# Patient Record
Sex: FEMALE | Race: BLACK OR AFRICAN AMERICAN | Employment: OTHER | URBAN - METROPOLITAN AREA
[De-identification: names, ages, dates, MRNs, and addresses within clinical notes are randomized per-mention and may not be internally consistent; named-entity substitution may affect disease eponyms.]

---

## 2018-02-07 PROBLEM — R73.9 HYPERGLYCEMIA: Status: ACTIVE | Noted: 2018-02-07

## 2018-02-07 PROBLEM — E78.5 DYSLIPIDEMIA: Status: ACTIVE | Noted: 2018-02-07

## 2018-02-07 PROCEDURE — 87624 HPV HI-RISK TYP POOLED RSLT: CPT | Performed by: FAMILY MEDICINE

## 2018-02-07 PROCEDURE — 88175 CYTOPATH C/V AUTO FLUID REDO: CPT | Performed by: FAMILY MEDICINE

## 2019-02-13 PROBLEM — M65.312 TRIGGER FINGER OF LEFT THUMB: Status: ACTIVE | Noted: 2019-02-13

## 2019-02-13 PROBLEM — N76.0 ACUTE VAGINITIS: Status: ACTIVE | Noted: 2019-02-13

## 2019-02-13 PROBLEM — K59.09 OTHER CONSTIPATION: Status: ACTIVE | Noted: 2019-02-13

## 2019-02-13 PROBLEM — Z12.4 ENCOUNTER FOR SCREENING FOR CERVICAL CANCER: Status: ACTIVE | Noted: 2019-02-13

## 2019-02-13 PROBLEM — Z12.31 VISIT FOR SCREENING MAMMOGRAM: Status: ACTIVE | Noted: 2019-02-13

## 2019-02-13 PROCEDURE — 87070 CULTURE OTHR SPECIMN AEROBIC: CPT | Performed by: FAMILY MEDICINE

## 2019-02-13 PROCEDURE — 88175 CYTOPATH C/V AUTO FLUID REDO: CPT | Performed by: FAMILY MEDICINE

## 2019-02-13 PROCEDURE — 87624 HPV HI-RISK TYP POOLED RSLT: CPT | Performed by: FAMILY MEDICINE

## 2019-02-13 PROCEDURE — 87205 SMEAR GRAM STAIN: CPT | Performed by: FAMILY MEDICINE

## 2019-02-13 PROCEDURE — 87077 CULTURE AEROBIC IDENTIFY: CPT | Performed by: FAMILY MEDICINE

## 2021-05-27 PROBLEM — E66.01 CLASS 2 SEVERE OBESITY DUE TO EXCESS CALORIES WITH SERIOUS COMORBIDITY AND BODY MASS INDEX (BMI) OF 39.0 TO 39.9 IN ADULT (HCC): Status: ACTIVE | Noted: 2021-05-27

## 2021-05-27 PROBLEM — E66.812 CLASS 2 SEVERE OBESITY DUE TO EXCESS CALORIES WITH SERIOUS COMORBIDITY AND BODY MASS INDEX (BMI) OF 39.0 TO 39.9 IN ADULT (HCC): Status: ACTIVE | Noted: 2021-05-27

## 2021-05-27 PROBLEM — Z23 NEED FOR DIPHTHERIA-TETANUS-PERTUSSIS (TDAP) VACCINE: Status: ACTIVE | Noted: 2021-05-27

## 2021-11-19 ENCOUNTER — HOSPITAL ENCOUNTER (INPATIENT)
Facility: HOSPITAL | Age: 66
LOS: 5 days | Discharge: HOME | DRG: 177 | End: 2021-11-24
Attending: EMERGENCY MEDICINE | Admitting: HOSPITALIST
Payer: MEDICARE

## 2021-11-19 ENCOUNTER — APPOINTMENT (OUTPATIENT)
Dept: GENERAL RADIOLOGY | Facility: HOSPITAL | Age: 66
DRG: 177 | End: 2021-11-19
Attending: EMERGENCY MEDICINE
Payer: MEDICARE

## 2021-11-19 DIAGNOSIS — U07.1 COVID-19: Primary | ICD-10-CM

## 2021-11-19 DIAGNOSIS — R09.02 HYPOXIA: ICD-10-CM

## 2021-11-19 PROCEDURE — 96374 THER/PROPH/DIAG INJ IV PUSH: CPT

## 2021-11-19 PROCEDURE — 85025 COMPLETE CBC W/AUTO DIFF WBC: CPT | Performed by: EMERGENCY MEDICINE

## 2021-11-19 PROCEDURE — 82550 ASSAY OF CK (CPK): CPT | Performed by: EMERGENCY MEDICINE

## 2021-11-19 PROCEDURE — 84484 ASSAY OF TROPONIN QUANT: CPT | Performed by: EMERGENCY MEDICINE

## 2021-11-19 PROCEDURE — 93005 ELECTROCARDIOGRAM TRACING: CPT

## 2021-11-19 PROCEDURE — 85379 FIBRIN DEGRADATION QUANT: CPT | Performed by: EMERGENCY MEDICINE

## 2021-11-19 PROCEDURE — 83615 LACTATE (LD) (LDH) ENZYME: CPT | Performed by: EMERGENCY MEDICINE

## 2021-11-19 PROCEDURE — 93010 ELECTROCARDIOGRAM REPORT: CPT

## 2021-11-19 PROCEDURE — 86140 C-REACTIVE PROTEIN: CPT | Performed by: EMERGENCY MEDICINE

## 2021-11-19 PROCEDURE — 96375 TX/PRO/DX INJ NEW DRUG ADDON: CPT

## 2021-11-19 PROCEDURE — 87040 BLOOD CULTURE FOR BACTERIA: CPT | Performed by: EMERGENCY MEDICINE

## 2021-11-19 PROCEDURE — 36415 COLL VENOUS BLD VENIPUNCTURE: CPT

## 2021-11-19 PROCEDURE — 84145 PROCALCITONIN (PCT): CPT | Performed by: EMERGENCY MEDICINE

## 2021-11-19 PROCEDURE — 82728 ASSAY OF FERRITIN: CPT | Performed by: EMERGENCY MEDICINE

## 2021-11-19 PROCEDURE — XW033E5 INTRODUCTION OF REMDESIVIR ANTI-INFECTIVE INTO PERIPHERAL VEIN, PERCUTANEOUS APPROACH, NEW TECHNOLOGY GROUP 5: ICD-10-PCS | Performed by: INTERNAL MEDICINE

## 2021-11-19 PROCEDURE — 80053 COMPREHEN METABOLIC PANEL: CPT | Performed by: EMERGENCY MEDICINE

## 2021-11-19 PROCEDURE — 99285 EMERGENCY DEPT VISIT HI MDM: CPT

## 2021-11-19 PROCEDURE — 71045 X-RAY EXAM CHEST 1 VIEW: CPT | Performed by: EMERGENCY MEDICINE

## 2021-11-19 PROCEDURE — 3E0333Z INTRODUCTION OF ANTI-INFLAMMATORY INTO PERIPHERAL VEIN, PERCUTANEOUS APPROACH: ICD-10-PCS | Performed by: INTERNAL MEDICINE

## 2021-11-19 PROCEDURE — 83880 ASSAY OF NATRIURETIC PEPTIDE: CPT | Performed by: EMERGENCY MEDICINE

## 2021-11-19 RX ORDER — ASPIRIN 81 MG/1
81 TABLET ORAL DAILY
Status: DISCONTINUED | OUTPATIENT
Start: 2021-11-20 | End: 2021-11-24

## 2021-11-19 RX ORDER — LISINOPRIL 20 MG/1
20 TABLET ORAL DAILY
Status: DISCONTINUED | OUTPATIENT
Start: 2021-11-20 | End: 2021-11-24

## 2021-11-19 RX ORDER — ALBUTEROL SULFATE 90 UG/1
2 AEROSOL, METERED RESPIRATORY (INHALATION) EVERY 4 HOURS PRN
Status: DISCONTINUED | OUTPATIENT
Start: 2021-11-19 | End: 2021-11-24

## 2021-11-19 RX ORDER — LISINOPRIL 20 MG/1
20 TABLET ORAL DAILY
COMMUNITY
End: 2022-02-01

## 2021-11-19 RX ORDER — B-COMPLEX WITH VITAMIN C
1 TABLET ORAL DAILY
COMMUNITY
End: 2022-01-05

## 2021-11-19 RX ORDER — ENOXAPARIN SODIUM 100 MG/ML
40 INJECTION SUBCUTANEOUS DAILY
Status: DISCONTINUED | OUTPATIENT
Start: 2021-11-19 | End: 2021-11-24

## 2021-11-19 RX ORDER — ACETAMINOPHEN 325 MG/1
650 TABLET ORAL EVERY 6 HOURS PRN
Status: DISCONTINUED | OUTPATIENT
Start: 2021-11-19 | End: 2021-11-24

## 2021-11-19 RX ORDER — LISINOPRIL 20 MG/1
20 TABLET ORAL DAILY
COMMUNITY
End: 2021-11-19 | Stop reason: CLARIF

## 2021-11-19 RX ORDER — ONDANSETRON 2 MG/ML
4 INJECTION INTRAMUSCULAR; INTRAVENOUS EVERY 6 HOURS PRN
Status: DISCONTINUED | OUTPATIENT
Start: 2021-11-19 | End: 2021-11-24

## 2021-11-19 RX ORDER — MELATONIN
3000 DAILY
Status: DISCONTINUED | OUTPATIENT
Start: 2021-11-20 | End: 2021-11-24

## 2021-11-19 RX ORDER — HYDROCHLOROTHIAZIDE 25 MG/1
25 TABLET ORAL DAILY
COMMUNITY

## 2021-11-19 RX ORDER — ALBUTEROL SULFATE 90 UG/1
2 AEROSOL, METERED RESPIRATORY (INHALATION) EVERY 4 HOURS PRN
COMMUNITY
Start: 2021-11-13 | End: 2022-01-05

## 2021-11-19 RX ORDER — HYDROCHLOROTHIAZIDE 25 MG/1
25 TABLET ORAL DAILY
Status: DISCONTINUED | OUTPATIENT
Start: 2021-11-20 | End: 2021-11-24

## 2021-11-19 RX ORDER — DEXAMETHASONE SODIUM PHOSPHATE 4 MG/ML
6 VIAL (ML) INJECTION ONCE
Status: COMPLETED | OUTPATIENT
Start: 2021-11-19 | End: 2021-11-19

## 2021-11-19 NOTE — ED PROVIDER NOTES
Patient Seen in: [facility] Emergency Department      History   Patient presents with:  Covid  Difficulty Breathing    Stated Complaint: shortness of breath .covid positive saturation 90-92% room air from immediate c*    Subjective:   HPI    Patient 128/56 (BP Location: Right arm)   Pulse 81   Temp 99.4 °F (37.4 °C) (Oral)   Resp 20   Ht 157.5 cm (5' 2\")   Wt 92.1 kg   SpO2 96%   BMI 37.13 kg/m²         Physical Exam    GENERAL: No acute distress, well appearing and non-toxic, Alert and oriented X 3 Resulted by: [id number]: K, LDH, AST,    CK CREATINE KINASE (NOT CREATININE) - Normal   CBC WITH DIFFERENTIAL WITH PLATELET    Narrative: The following orders were created for panel order CBC With Differential With Platelet.   Procedure will be given remdesivir. She is not vaccinated. Did receive monoclonal antibody treatment. Inflammatory markers are elevated. Normal troponin and normal D-dimer. Workup and results were discussed with patient.  Patient has no other questions, complain

## 2021-11-19 NOTE — H&P
[facility] Hospitalist History and Physical      Patient presents with:  Covid  Difficulty Breathing       PCP: Malia Kim MD      History of Present Illness: Patient is a 74year old female with PMH sig for HTN and DONNA who presented to the ED Age of Onset   • Hypertension Father    • Diabetes Maternal Grandmother    • Cancer Maternal Grandfather    • Breast Cancer Maternal Cousin Female 39        42's       Review of Systems  Comprehensive ROS reviewed and negative except for what is stated in vascularity. Patchy alveolar opacities in both mid lower lungs. Mild left basilar atelectasis. CONCLUSION:  Bilateral pneumonia is consistent with diagnosis of COVID-19 pneumonia.     Dictated by (CST): Victorina Umana MD on 11/19/2021 at 4:44 PM

## 2021-11-19 NOTE — ED QUICK NOTES
Orders for admission, patient is aware of plan and ready to go upstairs. Any questions, please call ED RN jonathan  at extension [phone].      Vaccinated? no  Type of COVID test sent: none  COVID Suspicion level: High, tested positive on 11/13      Titratable d

## 2021-11-19 NOTE — ED INITIAL ASSESSMENT (HPI)
Pt sent over from [address] [place] [address] for further eval. Pt covid + on 11/13. Pt presents c/o SOB, was 82% at TL desk on RA.

## 2021-11-20 PROCEDURE — 83615 LACTATE (LD) (LDH) ENZYME: CPT | Performed by: INTERNAL MEDICINE

## 2021-11-20 PROCEDURE — 85025 COMPLETE CBC W/AUTO DIFF WBC: CPT | Performed by: INTERNAL MEDICINE

## 2021-11-20 PROCEDURE — 80048 BASIC METABOLIC PNL TOTAL CA: CPT | Performed by: INTERNAL MEDICINE

## 2021-11-20 PROCEDURE — 86140 C-REACTIVE PROTEIN: CPT | Performed by: INTERNAL MEDICINE

## 2021-11-20 PROCEDURE — 82728 ASSAY OF FERRITIN: CPT | Performed by: INTERNAL MEDICINE

## 2021-11-20 RX ORDER — BENZONATATE 100 MG/1
100 CAPSULE ORAL 3 TIMES DAILY PRN
Status: DISCONTINUED | OUTPATIENT
Start: 2021-11-20 | End: 2021-11-24

## 2021-11-20 NOTE — PROGRESS NOTES
[facility] Hospitalist Progress Note     Daija Malagon Patient Status:  Inpatient    10/6/1955 MRN [id number]   [facility] 5NW-A Attending Joe Hodge, DO   Hosp Day # 1 PCP Werner Steele MD     Subject Cardiac  Recent Labs   Lab 11/19/21  1616 11/19/21  1835 11/19/21  2038   TROP <0.045 <0.045 <0.045   PBNP 10  --   --        Creatinine Kinase  Recent Labs   Lab 11/19/21  1616   CK 92       Inflammatory Markers  Recent Labs   Lab 11/19/21  1616 11/

## 2021-11-20 NOTE — PROGRESS NOTES
NURSING ADMISSION NOTE      Patient admitted via Cart to room 531  Oriented to room. Safety precautions initiated. Bed in low position. Call light in reach. Admission mack complete. VSS at this time.  Pt updated on poc, verbalized understanding of

## 2021-11-20 NOTE — CONSULTS
INFECTIOUS DISEASE CONSULTATION    Heidi Batres Patient Status:  Inpatient    10/6/1955 MRN [id number]   [facility] 5NW-A Attending Margaret Aponte MD   Hosp Day # 1 PCP Latasha Faulkner MD Intravenous, Q24H  •  albuterol 108 (90 Base) MCG/ACT inhaler 2 puff, 2 puff, Inhalation, Q4H PRN  •  aspirin EC tab 81 mg, 81 mg, Oral, Daily  •  Vitamin D3 (Cholecalciferol) (VITAMIN D3) tab 3,000 Units, 3,000 Units, Oral, Daily  •  hydroCHLOROthiazide ( swelling, no masses  Respiratory: Non labored, symmetric exursion  Cardiovascular: No irregularities in rhythm  Abdomen: Soft, nontender, nondistended. Musculoskeletal: Full range of motion of all extremities. No swelling noted.   Joints: no effusions comorbidity and body mass index (BMI) of 39.0 to 39.9 in adult [facility])     Need for diphtheria-tetanus-pertussis (Tdap) vaccine     COVID-19     Hypoxia      ASSESSMENT/PLAN:  1.  COVID-19 pneumonia  -unvaccinated for COVID-19  Underlying risk factors: age of

## 2021-11-20 NOTE — PLAN OF CARE
COVID-19 Daily Discharge Readiness-Nursing    O2 Sat at Rest:     93% on 3L  O2 Sat with Exertion:    % on    liters   Temperature max from last 24 hrs: Temp (24hrs), Av °F (37.2 °C), Min:98.2 °F (36.8 °C), Max:100 °F (37.8 °C)    Inflammatory Markers:

## 2021-11-20 NOTE — COVID NURSING ASSESSMENT
COVID-19 Daily Discharge Readiness-Nursing    O2 Sat at Rest:     %   O2 Sat with Exertion:    % on    liters   Temperature max from last 24 hrs: Temp (24hrs), Av.2 °F (37.3 °C), Min:98.2 °F (36.8 °C), Max:100 °F (37.8 °C)    Inflammatory Markers: Rece

## 2021-11-20 NOTE — PLAN OF CARE
Problem: Patient/Family Goals  Goal: Patient/Family Long Term Goal  Description: Patient's Long Term Goal: to discharge home with adequate resources     Interventions:  - comply with poc  -ID consult  -Remdesevir  - See additional Care Plan goals for spe

## 2021-11-21 PROCEDURE — 81001 URINALYSIS AUTO W/SCOPE: CPT | Performed by: EMERGENCY MEDICINE

## 2021-11-21 PROCEDURE — 80048 BASIC METABOLIC PNL TOTAL CA: CPT | Performed by: INTERNAL MEDICINE

## 2021-11-21 PROCEDURE — 85025 COMPLETE CBC W/AUTO DIFF WBC: CPT | Performed by: INTERNAL MEDICINE

## 2021-11-21 PROCEDURE — 86140 C-REACTIVE PROTEIN: CPT | Performed by: INTERNAL MEDICINE

## 2021-11-21 PROCEDURE — 83615 LACTATE (LD) (LDH) ENZYME: CPT | Performed by: INTERNAL MEDICINE

## 2021-11-21 PROCEDURE — 82728 ASSAY OF FERRITIN: CPT | Performed by: INTERNAL MEDICINE

## 2021-11-21 RX ORDER — POTASSIUM CHLORIDE 20 MEQ/1
40 TABLET, EXTENDED RELEASE ORAL ONCE
Status: COMPLETED | OUTPATIENT
Start: 2021-11-21 | End: 2021-11-21

## 2021-11-21 NOTE — PROGRESS NOTES
[facility] Hospitalist Progress Note     Althea Nogueira Patient Status:  Inpatient    10/6/1955 MRN [id number]   [facility] 5NW-A Attending Rafa Echols, DO   Hosp Day # 2 PCP Sarah العراقي MD     Subject Results   Component Value Date    COVID19 NOT DETECTED 11/20/2020       Pro-Calcitonin  Recent Labs   Lab 11/19/21  1616   PCT <0.05       Cardiac  Recent Labs   Lab 11/19/21  1616 11/19/21  1835 11/19/21 2038   TROP <0.045 <0.045 <0.045   PBNP 10  --   - and surgical mask) at time of exam with proper doning/doffing technique and maintined a distance of 6 feet while in room aside from during the physical exam.

## 2021-11-21 NOTE — COVID NURSING ASSESSMENT
COVID-19 Daily Discharge Readiness-Nursing    O2 Sat at Rest:     %   O2 Sat with Exertion:    % on    liters   Temperature max from last 24 hrs: Temp (24hrs), Av.4 °F (36.9 °C), Min:98.1 °F (36.7 °C), Max:98.7 °F (37.1 °C)    Inflammatory Markers: Rec

## 2021-11-21 NOTE — PROGRESS NOTES
11/21/21 1500   Mobility   O2 walk?  Yes   SPO2% on Oxygen at Rest 95   At rest oxygen flow (liters per minute) 2   SPO2% Ambulation on Oxygen 88   Ambulation oxygen flow (liters per minute) 3     Pt tolerated O2 walk w/ 2-3L

## 2021-11-21 NOTE — PROGRESS NOTES
[facility]                INFECTIOUS DISEASE PROGRESS NOTE    Althea Nogueira Patient Status:  Inpatient    10/6/1955 MRN [id number]   [facility] 5NW-A Attending Rafa Echols, DO   Hosp Day # 2 PCP MD Wendy Goncalves [address] Encounter on 11/19/21   1.  Blood Culture     Status: None (Preliminary result)    Collection Time: 11/19/21  4:50 PM    Specimen: Blood,peripheral   Result Value Ref Range    Blood Culture Result No Growth 1 Day N/A

## 2021-11-21 NOTE — PLAN OF CARE
COVID-19 Daily Discharge Readiness-Nursing    O2 Sat at Rest:  95   %   O2 Sat with Exertion: SPO2% Ambulation on Oxygen: 88  % on Ambulation oxygen flow (liters per minute): 3  liters   Temperature max from last 24 hrs: Temp (24hrs), Av.5 °F (36.9 °C)

## 2021-11-22 PROCEDURE — 82728 ASSAY OF FERRITIN: CPT | Performed by: INTERNAL MEDICINE

## 2021-11-22 PROCEDURE — 85025 COMPLETE CBC W/AUTO DIFF WBC: CPT | Performed by: INTERNAL MEDICINE

## 2021-11-22 PROCEDURE — 86140 C-REACTIVE PROTEIN: CPT | Performed by: INTERNAL MEDICINE

## 2021-11-22 PROCEDURE — 80048 BASIC METABOLIC PNL TOTAL CA: CPT | Performed by: INTERNAL MEDICINE

## 2021-11-22 NOTE — PROGRESS NOTES
[facility] Hospitalist Progress Note     Nguyen Stager Patient Status:  Inpatient    10/6/1955 MRN [id number]   [facility] 5NW-A Attending Kimi Trivedi, DO   Hosp Day # 3 PCP Porter Hernandez MD     Subject TROP <0.045 <0.045 <0.045   PBNP 10  --   --        Creatinine Kinase  Recent Labs   Lab 11/19/21  1616   CK 92       Inflammatory Markers  Recent Labs   Lab 11/19/21  1616 11/19/21  1616 11/20/21  0809 11/21/21  0802 11/22/21  0701   CRP 8.15*   < > 9.5

## 2021-11-22 NOTE — PAYOR COMM NOTE
--------------  ADMISSION REVIEW     Elvi Bedoya [place] [facility]  Subscriber #:  [id number]  Authorization Number: [id number]    Admit date: 11/19/21  Admit time:  8:10 PM       REVIEW DOCUMENTATION:         COVID-19 pneumonia          11/22/21      Resting on 1L evaluation of shortness of breath. Patient has had Covid symptoms since 11/9 or 11/10. She was diagnosed on 11/13. She had monoclonal antibody infusion on 11/16. She complains of increased shortness of breath. Does not have a pulse oximeter at home. mucous membranes. Pupils equal round reactive to light and accommodation, extraocular motion is intact, sclerae white, conjunctiva is pink. Oropharynx is unremarkable, no exudate. NECK: Supple, trachea midline, no lymphadenopathy.    LUNG: Crackles bilat ---------                               -----------         ------                     CBC W/ DIFFERENTIAL[[id number]]                              Final result                 Please view results for these tests on the individual orders.    URINALYSIS WI for further workup.   Admission disposition: 11/19/2021  5:16 PM                                  Disposition and Plan     Clinical Impression:  GENIV-24  (primary encounter diagnosis)  Hypoxia     Disposition:  Admit  11/19/2021  5:16 pm    Follow-up:  No Surgical History:   Procedure Laterality Date   • D & C     • OTHER SURGICAL HISTORY      cystoscopy        ALL:  No Known Allergies     hydroCHLOROthiazide 25 MG Oral Tab, Take 25 mg by mouth daily. lisinopril 20 MG Oral Tab, Take 20 mg by mouth daily. HCT 40.1 11/19/2021    .0 11/19/2021    CREATSERUM 0.74 11/19/2021    BUN 12 11/19/2021     11/19/2021    K 3.5 11/19/2021    CL 99 11/19/2021    CO2 29.0 11/19/2021     11/19/2021    CA 9.0 11/19/2021    ALB 2.6 11/19/2021    ALKPHO 59 previous hospital records reviewed. [facility] hospitalist to continue to follow patient while in house  A total of 70 minutes taken with patient and coordinating care. Greater than 50% face to face encounter.       [address] 11/21/21 1302 98.8 °F (37.1 °C) 76 18 131/89 95 % — Nasal cannula 2 L/min MM    11/21/21 0844 — — — — 96 % — Nasal cannula 2 L/min HC    11/21/21 0803 98.7 °F (37.1 °C) 68 18 155/76 94 % — Nasal cannula 3 L/min MM    11/21/21 0430 97.9 °F (36.6 °C) 69 18 1

## 2021-11-22 NOTE — COVID NURSING ASSESSMENT
COVID-19 Daily Discharge Readiness-Nursing    O2 Sat at Rest:  SPO2% on Room Air at Rest: 93  %   O2 Sat with Exertion: 90% on room air  Temperature max from last 24 hrs: Temp (24hrs), Av.2 °F (36.8 °C), Min:97.8 °F (36.6 °C), Max:98.6 °F (37 °C)    In

## 2021-11-22 NOTE — PROGRESS NOTES
[facility]                INFECTIOUS DISEASE PROGRESS NOTE    Elaina Stewart Patient Status:  Inpatient    10/6/1955 MRN [id number]   [facility] 5NW-A Attending Fawn Kennedy, DO   Hosp Day # 3 PCP MD Kindra Rivas CREATSERUM 0.74   < > 0.73 0.77 0.74   GFRAA 96   < > 98 92 96   GFRNAA 84   < > 85 80 84   CA 9.0   < > 9.1 9.2 9.0   ALB 2.6*  --   --   --   --       < > 138 139 140   K 3.5   < > 3.8 3.4* 3.8   CL 99   < > 103 103 105   CO2 29.0   < > 30.0 28.0 anticoagulation      Jim Tellez MD, MD  [facility]  [phone]

## 2021-11-22 NOTE — COVID NURSING ASSESSMENT
COVID-19 Daily Discharge Readiness-Nursing    O2 Sat at Rest:     %   O2 Sat with Exertion: SPO2% Ambulation on Oxygen: 88  % on Ambulation oxygen flow (liters per minute): 3  liters   Temperature max from last 24 hrs: Temp (24hrs), Av.4 °F (36.9 °C),

## 2021-11-23 NOTE — COVID NURSING ASSESSMENT
COVID-19 Daily Discharge Readiness-Nursing    O2 Sat at Rest:  SPO2% on Room Air at Rest: 89  %   O2 Sat with Exertion: SPO2% Ambulation on Oxygen: 90  % on Ambulation oxygen flow (liters per minute): 2  liters   Temperature max from last 24 hrs: Temp (24h

## 2021-11-23 NOTE — COVID NURSING ASSESSMENT
COVID-19 Daily Discharge Readiness-Nursing    O2 Sat at Rest:  SPO2% on Room Air at Rest: 93  %   O2 Sat with Exertion: SPO2% Ambulation on Oxygen: 88  % on Ambulation oxygen flow (liters per minute): 3  liters   Temperature max from last 24 hrs: Temp (24h

## 2021-11-23 NOTE — PLAN OF CARE
Problem: Patient/Family Goals  Goal: Patient/Family Long Term Goal  Description: Patient's Long Term Goal: to discharge home with adequate resources     Interventions:  - comply with poc  -ID consult  -Remdesevir  - See additional Care Plan goals for speci

## 2021-11-23 NOTE — PROGRESS NOTES
Mobility Goal: up to chair TID    Readmission Risk:     [] Low     [] Medium     [] High    Active issue(s) requiring resolution prior to discharge: hypoxia    Anticipated discharge date: TBD    Current discharge plan: Home    F/U appointment scheduled wit

## 2021-11-23 NOTE — CM/SW NOTE
sw order noted for obtaining COVID results.  sw looked through chart and was able to find encounter on 11/13/21 with Fernandotom Jennifer with the following results:    11/13/2021 Rapid SARS CoV + SARS CoV 2 Ag, QL IA, Respiratory Specimen       Results Positi

## 2021-11-23 NOTE — PROGRESS NOTES
[facility]                INFECTIOUS DISEASE PROGRESS NOTE    Shantel Pool Patient Status:  Inpatient    10/6/1955 MRN [id number]   [facility] 5NW-A Attending Segun Glaser, DO   Hosp Day # 4 PCP MD Unique Tran < > 16 22* 20*   CREATSERUM 0.74   < > 0.73 0.77 0.74   GFRAA 96   < > 98 92 96   GFRNAA 84   < > 85 80 84   CA 9.0   < > 9.1 9.2 9.0   ALB 2.6*  --   --   --   --       < > 138 139 140   K 3.5   < > 3.8 3.4* 3.8   CL 99   < > 103 103 105   CO2 29.0 continued improvement      Christa Abebe MD, MD  [facility]  [phone]

## 2021-11-23 NOTE — PROGRESS NOTES
[facility] Hospitalist Progress Note     Tod Lips Patient Status:  Inpatient    10/6/1955 MRN [id number]   [facility] 5NW-A Attending Precilla um, DO   Hosp Day # 4 PCP Wilton Suarez MD     Subject PBNP 10  --   --        Creatinine Kinase  Recent Labs   Lab 11/19/21  1616   CK 92       Inflammatory Markers  Recent Labs   Lab 11/19/21  1616 11/19/21  1616 11/20/21  0809 11/21/21  0802 11/22/21  0701   CRP 8.15*   < > 9.50* 4.24* 1.64*   LESLIE 533.1*

## 2021-11-23 NOTE — PROGRESS NOTES
11/23/21 0926   Mobility   O2 walk?  Yes   SPO2% on Room Air at Rest 89   SPO2% on Oxygen at Rest 97   At rest oxygen flow (liters per minute) 0   SPO2% Ambulation on Room Air 87   SPO2% Ambulation on Oxygen 90   Ambulation oxygen flow (liters per minute

## 2021-11-24 VITALS
SYSTOLIC BLOOD PRESSURE: 124 MMHG | WEIGHT: 203 LBS | RESPIRATION RATE: 18 BRPM | TEMPERATURE: 98 F | DIASTOLIC BLOOD PRESSURE: 71 MMHG | BODY MASS INDEX: 37.36 KG/M2 | HEIGHT: 62 IN | OXYGEN SATURATION: 95 % | HEART RATE: 77 BPM

## 2021-11-24 RX ORDER — ALBUTEROL SULFATE 90 UG/1
2 AEROSOL, METERED RESPIRATORY (INHALATION) EVERY 6 HOURS PRN
Qty: 1 EACH | Refills: 0 | Status: SHIPPED | OUTPATIENT
Start: 2021-11-24

## 2021-11-24 NOTE — PROGRESS NOTES
Discharged patient to home. PIV removed. Discharged paper discussed, given to patient. Patient Verbalized understandings. All due meds given. All needs met. Not in distress. In RA. Wheelchaired to lobby, picked up by .

## 2021-11-24 NOTE — PROGRESS NOTES
11/24/21 0835   Mobility   O2 walk?  Yes   SPO2% on Room Air at Rest 95   At rest oxygen flow (liters per minute) 0   SPO2% Ambulation on Room Air 91   Ambulation oxygen flow (liters per minute) 0

## 2021-11-24 NOTE — PROGRESS NOTES
[facility]                INFECTIOUS DISEASE PROGRESS NOTE    Bk Shine Patient Status:  Inpatient    10/6/1955 MRN [id number]   [facility] 5NW-A Attending Jayda Pinto, DO   Hosp Day # 5 PCP MD Johnathan Pisano 0. 77 0.74   GFRAA 96   < > 98 92 96   GFRNAA 84   < > 85 80 84   CA 9.0   < > 9.1 9.2 9.0   ALB 2.6*  --   --   --   --       < > 138 139 140   K 3.5   < > 3.8 3.4* 3.8   CL 99   < > 103 103 105   CO2 29.0   < > 30.0 28.0 26.0   ALKPHO 59  --   -- Consultants  [phone]

## 2021-11-24 NOTE — DIETARY NOTE
[address] [facility] admitted on 11/19 presents with COVID-19. PMH:  has a past medical history of High blood pressure, Obstructive sleep apnea (adult) (pediatric) (SPLIT NIGHT 4-28-16), and Sleep apnea.      Admitting di

## 2021-11-24 NOTE — PROGRESS NOTES
COVID-19 Daily Discharge Readiness-Nursing    O2 Sat at Rest:  SPO2% on Room Air at Rest: 95  %   O2 Sat with Exertion: SPO2% Ambulation on Oxygen: 90  % on Ambulation oxygen flow (liters per minute): 0  liters   Temperature max from last 24 hrs: Temp (24h

## 2021-11-26 NOTE — PAYOR COMM NOTE
Discharge Notification    Patient Name: Agusto Bernaler: [facility]  Subscriber #: [id number]  Authorization Number: [id number]  Admit Date/Time: 11/19/2021 3:36 PM  Discharge Date/Time: 11/24/2021 5:06 PM

## 2021-12-03 PROBLEM — J12.82 PNEUMONIA DUE TO COVID-19 VIRUS: Status: ACTIVE | Noted: 2021-12-03

## 2021-12-03 PROBLEM — R09.02 HYPOXEMIA: Status: ACTIVE | Noted: 2021-12-03

## 2021-12-03 PROBLEM — Z92.89 HOSPITALIZATION WITHIN LAST 30 DAYS: Status: ACTIVE | Noted: 2021-12-03

## 2021-12-03 PROBLEM — U07.1 PNEUMONIA DUE TO COVID-19 VIRUS: Status: ACTIVE | Noted: 2021-12-03

## 2022-01-05 PROBLEM — H61.21 IMPACTED CERUMEN OF RIGHT EAR: Status: ACTIVE | Noted: 2022-01-05

## 2022-01-05 PROBLEM — Z92.89 HOSPITALIZATION WITHIN LAST 30 DAYS: Status: RESOLVED | Noted: 2021-12-03 | Resolved: 2022-01-05

## 2022-01-05 PROBLEM — F51.01 PRIMARY INSOMNIA: Status: ACTIVE | Noted: 2022-01-05

## 2022-01-05 PROBLEM — N76.0 ACUTE VAGINITIS: Status: RESOLVED | Noted: 2019-02-13 | Resolved: 2022-01-05

## 2022-01-05 PROBLEM — M65.312 TRIGGER FINGER OF LEFT THUMB: Status: RESOLVED | Noted: 2019-02-13 | Resolved: 2022-01-05

## 2022-01-05 PROBLEM — R09.02 HYPOXEMIA: Status: RESOLVED | Noted: 2021-12-03 | Resolved: 2022-01-05

## 2022-01-05 PROBLEM — R13.12 OROPHARYNGEAL DYSPHAGIA: Status: ACTIVE | Noted: 2022-01-05

## 2022-01-05 PROBLEM — R09.02 HYPOXIA: Status: RESOLVED | Noted: 2021-11-19 | Resolved: 2022-01-05

## 2022-01-05 PROBLEM — IMO0001 SEVERE ACUTE RESPIRATORY SYNDROME CORONAVIRUS 2 (SARS-COV-2) VACCINATION NOT INDICATED: Status: ACTIVE | Noted: 2021-11-13

## 2022-01-05 PROBLEM — M25.512 ACUTE PAIN OF LEFT SHOULDER: Status: ACTIVE | Noted: 2022-01-05

## 2024-07-26 ENCOUNTER — HOSPITAL ENCOUNTER (OUTPATIENT)
Facility: HOSPITAL | Age: 69
Setting detail: OBSERVATION
Discharge: HOME | End: 2024-07-26
Attending: EMERGENCY MEDICINE | Admitting: INTERNAL MEDICINE
Payer: MEDICARE

## 2024-07-26 ENCOUNTER — APPOINTMENT (OUTPATIENT)
Dept: CV DIAGNOSTICS | Facility: HOSPITAL | Age: 69
End: 2024-07-26
Attending: INTERNAL MEDICINE
Payer: MEDICARE

## 2024-07-26 ENCOUNTER — APPOINTMENT (OUTPATIENT)
Dept: GENERAL RADIOLOGY | Facility: HOSPITAL | Age: 69
End: 2024-07-26
Attending: EMERGENCY MEDICINE
Payer: MEDICARE

## 2024-07-26 VITALS
HEIGHT: 62 IN | DIASTOLIC BLOOD PRESSURE: 68 MMHG | OXYGEN SATURATION: 99 % | SYSTOLIC BLOOD PRESSURE: 134 MMHG | HEART RATE: 65 BPM | RESPIRATION RATE: 20 BRPM | BODY MASS INDEX: 39.02 KG/M2 | WEIGHT: 212.06 LBS | TEMPERATURE: 98 F

## 2024-07-26 DIAGNOSIS — I20.0 UNSTABLE ANGINA (HCC): Primary | ICD-10-CM

## 2024-07-26 LAB
ALBUMIN SERPL-MCNC: 4.1 G/DL (ref 3.2–4.8)
ALBUMIN/GLOB SERPL: 1.3 {RATIO} (ref 1–2)
ALP LIVER SERPL-CCNC: 87 U/L
ALT SERPL-CCNC: 19 U/L
ANION GAP SERPL CALC-SCNC: 1 MMOL/L (ref 0–18)
ANION GAP SERPL CALC-SCNC: 5 MMOL/L (ref 0–18)
AST SERPL-CCNC: 18 U/L (ref ?–34)
ATRIAL RATE: 67 BPM
BASOPHILS # BLD AUTO: 0.04 X10(3) UL (ref 0–0.2)
BASOPHILS NFR BLD AUTO: 0.4 %
BILIRUB SERPL-MCNC: 0.3 MG/DL (ref 0.2–1.1)
BUN BLD-MCNC: 16 MG/DL (ref 9–23)
BUN BLD-MCNC: 19 MG/DL (ref 9–23)
CALCIUM BLD-MCNC: 8.8 MG/DL (ref 8.7–10.4)
CALCIUM BLD-MCNC: 8.9 MG/DL (ref 8.7–10.4)
CHLORIDE SERPL-SCNC: 107 MMOL/L (ref 98–112)
CHLORIDE SERPL-SCNC: 109 MMOL/L (ref 98–112)
CHOLEST SERPL-MCNC: 167 MG/DL (ref ?–200)
CK SERPL-CCNC: 243 U/L
CO2 SERPL-SCNC: 29 MMOL/L (ref 21–32)
CO2 SERPL-SCNC: 31 MMOL/L (ref 21–32)
CREAT BLD-MCNC: 0.84 MG/DL
CREAT BLD-MCNC: 0.95 MG/DL
EGFRCR SERPLBLD CKD-EPI 2021: 64 ML/MIN/1.73M2 (ref 60–?)
EGFRCR SERPLBLD CKD-EPI 2021: 75 ML/MIN/1.73M2 (ref 60–?)
EOSINOPHIL # BLD AUTO: 0.24 X10(3) UL (ref 0–0.7)
EOSINOPHIL NFR BLD AUTO: 2.6 %
ERYTHROCYTE [DISTWIDTH] IN BLOOD BY AUTOMATED COUNT: 14 %
GLOBULIN PLAS-MCNC: 3.1 G/DL (ref 2–3.5)
GLUCOSE BLD-MCNC: 107 MG/DL (ref 70–99)
GLUCOSE BLD-MCNC: 81 MG/DL (ref 70–99)
HCT VFR BLD AUTO: 38 %
HDLC SERPL-MCNC: 55 MG/DL (ref 40–59)
HGB BLD-MCNC: 13 G/DL
IMM GRANULOCYTES # BLD AUTO: 0.03 X10(3) UL (ref 0–1)
IMM GRANULOCYTES NFR BLD: 0.3 %
LDLC SERPL CALC-MCNC: 101 MG/DL (ref ?–100)
LYMPHOCYTES # BLD AUTO: 4.48 X10(3) UL (ref 1–4)
LYMPHOCYTES NFR BLD AUTO: 48.8 %
MCH RBC QN AUTO: 29.2 PG (ref 26–34)
MCHC RBC AUTO-ENTMCNC: 34.2 G/DL (ref 31–37)
MCV RBC AUTO: 85.4 FL
MONOCYTES # BLD AUTO: 0.63 X10(3) UL (ref 0.1–1)
MONOCYTES NFR BLD AUTO: 6.9 %
NEUTROPHILS # BLD AUTO: 3.76 X10 (3) UL (ref 1.5–7.7)
NEUTROPHILS # BLD AUTO: 3.76 X10(3) UL (ref 1.5–7.7)
NEUTROPHILS NFR BLD AUTO: 41 %
NONHDLC SERPL-MCNC: 112 MG/DL (ref ?–130)
OSMOLALITY SERPL CALC.SUM OF ELEC: 292 MOSM/KG (ref 275–295)
OSMOLALITY SERPL CALC.SUM OF ELEC: 295 MOSM/KG (ref 275–295)
P AXIS: 39 DEGREES
P-R INTERVAL: 160 MS
PLATELET # BLD AUTO: 254 10(3)UL (ref 150–450)
POTASSIUM SERPL-SCNC: 3.6 MMOL/L (ref 3.5–5.1)
POTASSIUM SERPL-SCNC: 3.7 MMOL/L (ref 3.5–5.1)
PROT SERPL-MCNC: 7.2 G/DL (ref 5.7–8.2)
Q-T INTERVAL: 424 MS
QRS DURATION: 92 MS
QTC CALCULATION (BEZET): 448 MS
R AXIS: -1 DEGREES
RBC # BLD AUTO: 4.45 X10(6)UL
SODIUM SERPL-SCNC: 141 MMOL/L (ref 136–145)
SODIUM SERPL-SCNC: 141 MMOL/L (ref 136–145)
T AXIS: 19 DEGREES
TRIGL SERPL-MCNC: 57 MG/DL (ref 30–149)
TROPONIN I SERPL HS-MCNC: 3 NG/L
TROPONIN I SERPL HS-MCNC: 3 NG/L
TROPONIN I SERPL HS-MCNC: 4 NG/L
VENTRICULAR RATE: 67 BPM
VLDLC SERPL CALC-MCNC: 9 MG/DL (ref 0–30)
WBC # BLD AUTO: 9.2 X10(3) UL (ref 4–11)

## 2024-07-26 PROCEDURE — 78452 HT MUSCLE IMAGE SPECT MULT: CPT | Performed by: INTERNAL MEDICINE

## 2024-07-26 PROCEDURE — 93306 TTE W/DOPPLER COMPLETE: CPT | Performed by: INTERNAL MEDICINE

## 2024-07-26 PROCEDURE — 82550 ASSAY OF CK (CPK): CPT | Performed by: INTERNAL MEDICINE

## 2024-07-26 PROCEDURE — 93010 ELECTROCARDIOGRAM REPORT: CPT

## 2024-07-26 PROCEDURE — 85025 COMPLETE CBC W/AUTO DIFF WBC: CPT | Performed by: EMERGENCY MEDICINE

## 2024-07-26 PROCEDURE — 84484 ASSAY OF TROPONIN QUANT: CPT | Performed by: INTERNAL MEDICINE

## 2024-07-26 PROCEDURE — 99285 EMERGENCY DEPT VISIT HI MDM: CPT

## 2024-07-26 PROCEDURE — 71045 X-RAY EXAM CHEST 1 VIEW: CPT | Performed by: EMERGENCY MEDICINE

## 2024-07-26 PROCEDURE — 93017 CV STRESS TEST TRACING ONLY: CPT | Performed by: INTERNAL MEDICINE

## 2024-07-26 PROCEDURE — 93005 ELECTROCARDIOGRAM TRACING: CPT

## 2024-07-26 PROCEDURE — 80053 COMPREHEN METABOLIC PANEL: CPT | Performed by: EMERGENCY MEDICINE

## 2024-07-26 PROCEDURE — 84484 ASSAY OF TROPONIN QUANT: CPT | Performed by: EMERGENCY MEDICINE

## 2024-07-26 PROCEDURE — 80061 LIPID PANEL: CPT | Performed by: HOSPITALIST

## 2024-07-26 PROCEDURE — 36415 COLL VENOUS BLD VENIPUNCTURE: CPT

## 2024-07-26 RX ORDER — ALBUTEROL SULFATE 90 UG/1
2 AEROSOL, METERED RESPIRATORY (INHALATION) EVERY 6 HOURS PRN
Status: DISCONTINUED | OUTPATIENT
Start: 2024-07-26 | End: 2024-07-26

## 2024-07-26 RX ORDER — LISINOPRIL 40 MG/1
40 TABLET ORAL DAILY
Status: DISCONTINUED | OUTPATIENT
Start: 2024-07-26 | End: 2024-07-26

## 2024-07-26 RX ORDER — ASPIRIN 81 MG/1
81 TABLET, CHEWABLE ORAL DAILY
Status: DISCONTINUED | OUTPATIENT
Start: 2024-07-26 | End: 2024-07-26

## 2024-07-26 RX ORDER — ACETAMINOPHEN 500 MG
500 TABLET ORAL EVERY 4 HOURS PRN
Status: DISCONTINUED | OUTPATIENT
Start: 2024-07-26 | End: 2024-07-26

## 2024-07-26 RX ORDER — MELATONIN
3 NIGHTLY PRN
Status: DISCONTINUED | OUTPATIENT
Start: 2024-07-26 | End: 2024-07-26

## 2024-07-26 RX ORDER — POTASSIUM CHLORIDE 20 MEQ/1
40 TABLET, EXTENDED RELEASE ORAL ONCE
Status: COMPLETED | OUTPATIENT
Start: 2024-07-26 | End: 2024-07-26

## 2024-07-26 RX ORDER — HYDROCHLOROTHIAZIDE 25 MG/1
25 TABLET ORAL DAILY
Status: DISCONTINUED | OUTPATIENT
Start: 2024-07-26 | End: 2024-07-26

## 2024-07-26 RX ORDER — ENOXAPARIN SODIUM 100 MG/ML
40 INJECTION SUBCUTANEOUS DAILY
Status: DISCONTINUED | OUTPATIENT
Start: 2024-07-26 | End: 2024-07-26

## 2024-07-26 RX ORDER — ASPIRIN 81 MG/1
243 TABLET, CHEWABLE ORAL ONCE
Status: COMPLETED | OUTPATIENT
Start: 2024-07-26 | End: 2024-07-26

## 2024-07-26 RX ORDER — LISINOPRIL 40 MG/1
40 TABLET ORAL DAILY
COMMUNITY
Start: 2024-06-04

## 2024-07-26 RX ORDER — ATORVASTATIN CALCIUM 40 MG/1
40 TABLET, FILM COATED ORAL DAILY
Status: DISCONTINUED | OUTPATIENT
Start: 2024-07-26 | End: 2024-07-26

## 2024-07-26 RX ORDER — PANTOPRAZOLE SODIUM 40 MG/1
40 TABLET, DELAYED RELEASE ORAL
Status: DISCONTINUED | OUTPATIENT
Start: 2024-07-26 | End: 2024-07-26

## 2024-07-26 NOTE — ED PROVIDER NOTES
Patient Seen in: [facility] Emergency Department      History     Chief Complaint   Patient presents with    Chest Pain Angina     Stated Complaint: chest pain    Subjective:   HPI    Patient is a 68-year-old female presents the ED for evaluation of chest pain.  Patient states around 1130 this evening she was standing putting things away in her house when she developed a left-sided chest discomfort that radiated to her left jaw.  Lasted for about 10 to 15 minutes.  Denied associated shortness of breath, vomiting or diaphoresis.  No fever, chills or cough.  Cardiac risk factors include age and hypertension.  She has never had a cardiac stress test.  Her symptoms are gone.  She has never had this before.  Patient denies thromboembolic risk factors such as family history, hormone therapy, smoking, recent travel, recent surgery.    Objective:   Past Medical History:    High blood pressure    Obstructive sleep apnea (adult) (pediatric)    AHI 45 SaO2 cintia  80 % CPAP 10 Premier    Sleep apnea              Past Surgical History:   Procedure Laterality Date    D & c      Other surgical history      cystoscopy                Social History     Socioeconomic History    Marital status:    Tobacco Use    Smoking status: Never    Smokeless tobacco: Never   Vaping Use    Vaping status: Never Used   Substance and Sexual Activity    Alcohol use: No     Alcohol/week: 0.0 standard drinks of alcohol    Drug use: No     Social Determinants of Health     Food Insecurity: No Food Insecurity (7/26/2024)    Food Insecurity     Food Insecurity: Never true   Transportation Needs: No Transportation Needs (7/26/2024)    Transportation Needs     Lack of Transportation: No   Housing Stability: Low Risk  (7/26/2024)    Housing Stability     Housing Instability: No              Review of Systems    Positive for stated Chief Complaint: Chest Pain Angina    Other systems are as noted in HPI.  Constitutional and vital signs reviewed.       All other systems reviewed and negative except as noted above.    Physical Exam     ED Triage Vitals [07/26/24 0112]   /78   Pulse 68   Resp 18   Temp 97 °F (36.1 °C)   Temp src Oral   SpO2 97 %   O2 Device None (Room air)       Current Vitals:   Vital Signs  BP: (!) 164/84  Pulse: 69  Resp: 22  Temp: 97.5 °F (36.4 °C)  Temp src: Oral  MAP (mmHg): (!) 109    Oxygen Therapy  SpO2: 99 %  O2 Device: None (Room air)  O2 Flow Rate (L/min): 0 L/min  Pulse Oximetry Type: Continuous  Oximetry Probe Site Changed: No  Pulse Ox Probe Location: Left hand            Physical Exam    GENERAL: No acute distress, well appearing and non-toxic, Alert and oriented X 3   HEENT: Normocephalic, atraumatic.  Moist mucous membranes.  Pupils equal round reactive to light and accommodation, extraocular motion is intact, sclerae white, conjunctiva is pink.  Oropharynx is unremarkable, no exudate.  NECK: Supple, trachea midline, no lymphadenopathy.   LUNG: Lungs clear to auscultation bilaterally, no wheezing, no rales, no rhonchi.  CARDIOVASCULAR: Regular rate and rhythm.  Normal S1S2.  No S3S4 or murmur.  ABDOMEN: Bowel sounds are present. Soft. nondistended, no pulsatile masses. nontender  MUSCULOSKELETAL: No calf tenderness.  Dorsalis and Posterior Tibial pulses present. No clubbing. No cyanosis.  No edema.   SKIN EXAMINATIoN: Warm and dry with normal appearance.  No rashes or lesions.  NEUROLOGICAL:  Motor strength intact all groups.  normal sensation, speech intact    ED Course     Labs Reviewed   COMP METABOLIC PANEL (14) - Abnormal; Notable for the following components:       Result Value    Glucose 107 (*)     All other components within normal limits   CBC W/ DIFFERENTIAL - Abnormal; Notable for the following components:    Lymphocyte Absolute 4.48 (*)     All other components within normal limits   TROPONIN I HIGH SENSITIVITY - Normal   CBC WITH DIFFERENTIAL WITH PLATELET    Narrative:     The following orders were created  for panel order CBC With Differential With Platelet.  Procedure                               Abnormality         Status                     ---------                               -----------         ------                     CBC W/ DIFFERENTIAL[[id number]]          Abnormal            Final result                 Please view results for these tests on the individual orders.   BASIC METABOLIC PANEL (8)   TROPONIN I HIGH SENSITIVITY   RAINBOW DRAW BLUE     EKG    Rate, intervals and axes as noted on EKG Report.  Rate: 67  Rhythm: Sinus Rhythm  Reading: no acute changes                 I personally reviewed xray films of chest and independent interpretation shows no evidence for pneumonia.  I also reviewed formal xray report as read by radiology with findings below:  Chest x-ray read by [facility] radiology negative for acute process    Medications   albuterol (Ventolin HFA) 108 (90 Base) MCG/ACT inhaler 2 puff (has no administration in time range)   aspirin chewable tab 81 mg (has no administration in time range)   hydroCHLOROthiazide tab 25 mg (has no administration in time range)   lisinopril (Prinivil; Zestril) tab 40 mg (has no administration in time range)   pantoprazole (Protonix) DR tab 40 mg (has no administration in time range)   enoxaparin (Lovenox) 40 MG/0.4ML SUBQ injection 40 mg (has no administration in time range)   acetaminophen (Tylenol Extra Strength) tab 500 mg (has no administration in time range)   melatonin tab 3 mg (has no administration in time range)   aspirin chewable tab 243 mg (243 mg Oral Given 7/26/24 0131)              MDM      Patient is a 68-year-old female presents to ED for evaluation of chest pain radiating to her jaw.  Now resolved upon arrival.  Differential unstable angina, costochondritis, esophagitis.  No thromboembolic risk factors.  Patient had laboratory workup performed which was unremarkable.  Normal troponin.  EKG normal.  Chest x-ray normal.  Symptoms concerning for unstable  angina given radiation to the jaw.  Asymptomatic now.  Has not had provocative testing.  Recommend admission to hospital for provocative cardiac testing.  Case discussed with hospitalist.  Aspirin given.  Admission disposition: 7/26/2024  2:13 AM                                        Medical Decision Making      Disposition and Plan     Clinical Impression:  1. Unstable angina (HCC)         Disposition:  Admit  7/26/2024  2:13 am    Follow-up:  No follow-up provider specified.        Medications Prescribed:  Current Discharge Medication List                            Hospital Problems       Present on Admission  Date Reviewed: 1/18/2022            ICD-10-CM Noted POA    * (Principal) Unstable angina (HCC) I20.0 7/26/2024 Unknown

## 2024-07-26 NOTE — PLAN OF CARE
Received patient at 0730. Alert and oriented x4. Tele rhythm NSR. O2 saturation 99%. Breath sounds clear. Bed is locked and in low position. Call light and personal belongings in reach. No c/o chest pain or shortness of breath at this time. Pt voiding with no issue. Pt ambulated in hallway with no issues. Skin dry and intact. Plan for stress test today. Care plan reviewed, pt verbalizes understanding.       Problem: Patient/Family Goals  Goal: Patient/Family Long Term Goal  Description: Patient's Long Term Goal: stay out of hospital    Interventions:  - Follow up MD  - See additional Care Plan goals for specific interventions  Outcome: Progressing  Goal: Patient/Family Short Term Goal  Description: Patient's Short Term Goal: Go home     Interventions:   - MD rounding  -Labs  -Stress test  - See additional Care Plan goals for specific interventions  Outcome: Progressing     Problem: SAFETY ADULT - FALL  Goal: Free from fall injury  Description: INTERVENTIONS:  - Assess pt frequently for physical needs  - Identify cognitive and physical deficits and behaviors that affect risk of falls.  - [place] fall precautions as indicated by assessment.  - Educate pt/family on patient safety including physical limitations  - Instruct pt to call for assistance with activity based on assessment  - Modify environment to reduce risk of injury  - Provide assistive devices as appropriate  - Consider OT/PT consult to assist with strengthening/mobility  - Encourage toileting schedule  Outcome: Progressing     Problem: PAIN - ADULT  Goal: Verbalizes/displays adequate comfort level or patient's stated pain goal  Description: INTERVENTIONS:  - Encourage pt to monitor pain and request assistance  - Assess pain using appropriate pain scale  - Administer analgesics based on type and severity of pain and evaluate response  - Implement non-pharmacological measures as appropriate and evaluate response  - Consider cultural and social influences  on pain and pain management  - Manage/alleviate anxiety  - Utilize distraction and/or relaxation techniques  - Monitor for opioid side effects  - Notify MD/LIP if interventions unsuccessful or patient reports new pain  - Anticipate increased pain with activity and pre-medicate as appropriate  Outcome: Progressing     Problem: CARDIOVASCULAR - ADULT  Goal: Maintains optimal cardiac output and hemodynamic stability  Description: INTERVENTIONS:  - Monitor vital signs, rhythm, and trends  - Monitor for bleeding, hypotension and signs of decreased cardiac output  - Evaluate effectiveness of vasoactive medications to optimize hemodynamic stability  - Monitor arterial and/or venous puncture sites for bleeding and/or hematoma  - Assess quality of pulses, skin color and temperature  - Assess for signs of decreased coronary artery perfusion - ex. Angina  - Evaluate fluid balance, assess for edema, trend weights  Outcome: Progressing  Goal: Absence of cardiac arrhythmias or at baseline  Description: INTERVENTIONS:  - Continuous cardiac monitoring, monitor vital signs, obtain 12 lead EKG if indicated  - Evaluate effectiveness of antiarrhythmic and heart rate control medications as ordered  - Initiate emergency measures for life threatening arrhythmias  - Monitor electrolytes and administer replacement therapy as ordered  Outcome: Progressing

## 2024-07-26 NOTE — CONSULTS
[facility]  Report of Consultation    Soo Verdugo Patient Status:  Observation    10/6/1955 MRN [id number]   [facility] Attending Alli Saleem MD   Hosp Day # 0 PCP Radha Restrepo MD     Reason for Consultation:   Chest pain    History of Present Illness:   Soo Verdugo is a(n) 68 year old female with a past history of HTN, DONNA, and GERD.  Pt noted onset of chest discomfort at 2330 last night which she initially attributed to indigestion,  Has this sensation on occasion and uses OTC antacid remedy usually,  Last night, noted L jaw discomfort in addition to the indigestion sensation.  This was the first time that has ever happened and concerned the patient.  No associated SOB,N,D.  Episode lasted roughly 15 minutes and  has  not recurred.  Pt is typically a reasonably active individual.  She estimates that she is able to walk a mile without chest discomfort or SOB.  No edema, orthopnea, or PND.  No palpitations.  Reports \"passing out\" on occasion.  States that this happens when laughing very hard at her daughter's humor - states that is the only circumstance that happens in.  Only once has she fallen to the ground with LOC years ago in these circumstances.  States that it happened in ER last delonte  while awaiting a bed.  No arrhythmia recorded.         Past Medical History:   Past Medical History:    High blood pressure    Obstructive sleep apnea (adult) (pediatric)    AHI 43 SaO2 cintia  80 % CPAP 10 Premier    Sleep apnea       Social History:   Smoking:  None  Alcohol:  None    Family History:   Questionable family history of premature arthrosclerotic heart disease - father  49 with MI or CVA, pt is not sure which.    Medications:   Scheduled:    aspirin  81 mg Oral Daily    hydroCHLOROthiazide  25 mg Oral Daily    lisinopril  40 mg Oral Daily    pantoprazole  40 mg Oral BID AC    enoxaparin  40 mg Subcutaneous Daily       Continuous Infusion:       PRN Medications:      albuterol    acetaminophen    melatonin    Outpatient Medications:   No current facility-administered medications on file prior to encounter.     Current Outpatient Medications on File Prior to Encounter   Medication Sig Dispense Refill    lisinopril 40 MG Oral Tab Take 1 tablet (40 mg total) by mouth daily.      HYDROCHLOROTHIAZIDE 25 MG Oral Tab TAKE 1 TABLET EVERY DAY (Patient taking differently: Take 1 tablet (25 mg total) by mouth daily.) 90 tablet 0    Omega-3 Fatty Acids (FISH OIL) 1200 MG Oral Capsule Delayed Release Take 1 tablet by mouth daily.      aspirin 81 MG Oral Tab Take 1 tablet (81 mg total) by mouth daily.      Cholecalciferol (VITAMIN D3) 3000 UNITS Oral Tab Take 3,000 Units by mouth daily.      Multiple Vitamins-Minerals (MULTI-VITAMIN/MINERALS) Oral Tab Take 1 tablet by mouth daily.      B Complex-C-Folic Acid (HM VITAMIN B COMPLEX/VITAMIN C) Oral Tab Take 1 tablet by mouth daily.         Allergies:   No Known Allergies    Review of Systems:   No fevers, chills, change in weight or bowel habits.  Ten point review of systems is otherwise negative or unremarkable.    Physical Exam:   Vitals:    07/26/24 0742   BP: 134/73   Pulse: 73   Resp: 20   Temp: 98.7 °F (37.1 °C)     Wt Readings from Last 3 Encounters:   07/26/24 212 lb 1.3 oz (96.2 kg)   01/18/22 198 lb (89.8 kg)   01/05/22 203 lb (92.1 kg)           General: Well developed, well nourished female.  Pt is in no acute distress.  HEENT:   Normocephalic.  Atraumatic.  Eyes with no scleral icterus.  Neck: Supple.  No JVD.  Carotids 2+ and equal in symmetric fashion.  No bruits are noted.  Cardiac: Regular rate and rhythm.   There is a normal S1 and S2.  No S3 or S4.  No murmurs, rubs, or gallops.  PMI is non-displaced with a normal apical impulse.  Lungs: Clear to ascultation bilaterally.  No focal rales, rhonchi, or wheezes.  Good air movement is noted throughout all lung fields.  Abdomen: Soft.  Non-distended.  Non-tender.  Bowel sounds are  present and normoactive.  No guarding or rebound.   Extremities: Extremities do not demonstrate any evidence of peripheral edema.   No cyanosis or clubbing of the digits is appreciated.  Femoral, Dorsalis Pedis, and Posterior Tibialis  pulses are 2+ and equal in a symmetric fashion.  Neurologic: Alert and oriented, normal affect.  No gross deficit appreciated.  Integument:  No visible rashes are appreciated.      Laboratories and Data:   Labs:    Recent Labs   Lab 07/26/24  0128   *   BUN 19   CREATSERUM 0.95   CA 8.9   ALB 4.1      K 3.7      CO2 29.0   ALKPHO 87   AST 18   ALT 19   BILT 0.3   TP 7.2       Recent Labs   Lab 07/26/24  0128   RBC 4.45   HGB 13.0   HCT 38.0   MCV 85.4   MCH 29.2   MCHC 34.2   RDW 14.0   NEPRELIM 3.76   WBC 9.2   .0       No results for input(s): \"PTP\", \"INR\" in the last 168 hours.    No results for input(s): \"TROP\", \"CK\" in the last 168 hours.    Diagnostics:   Tele:  SR  EKG: SR.  LVH        Assessment:   Chest discomfort with associated jaw discomfort  -No acute ischemic ECG changes  -Initial Troponin WNL  2.     HTN  3.     DONNA  4.     Syncope reported   -States recurrent with intense laughing.  Potentially vagally mediated   -Reports episode in ER last night.  No documented arrhythmia  5.     Questionable family Hx of CAD        Plan:   EcASA   Continue current antihypertensives   Repeat Troponin this AM   Echo   If repeat Troponin is NL, plan stress cardiolite today   Tele monitor   14 day patch monitor as OP if above is unremarkable   F/U 1 month if above unremarkable.    Ramon Savage MD  7/26/2024  9:31 AM

## 2024-07-26 NOTE — ED QUICK NOTES
Orders for admission, patient is aware of plan and ready to go upstairs. Any questions, please call ED RN Jaymie at extension [phone].     Patient Covid vaccination status: Unvaccinated     COVID Test Ordered in ED: None    COVID Suspicion at Admission: N/A    Running Infusions:  None    Mental Status/LOC at time of transport: A&Ox4    Other pertinent information:   CIWA score: N/A   NIH score:  N/A

## 2024-07-26 NOTE — PLAN OF CARE
Assumed care of patient at 0430. Patient is alert and oriented x4. Patient is on room air. NSR on tele. No pain currently reported. Denied CP. Breath sounds clear. No cough reported.  Continentx2  Up ad steph  Skin intact, skin checked with PCT Jen  Discussed plan of care with patient. All of patient's questions answered at this time.  POC:   Stress test    Problem: Patient/Family Goals  Goal: Patient/Family Long Term Goal  Description: Patient's Long Term Goal: stay out of hospital    Interventions:  - Follow up MD  - See additional Care Plan goals for specific interventions  Outcome: Progressing  Goal: Patient/Family Short Term Goal  Description: Patient's Short Term Goal: Go home     Interventions:   - MD rounding  -Labs  -Stress test  - See additional Care Plan goals for specific interventions  Outcome: Progressing     Problem: SAFETY ADULT - FALL  Goal: Free from fall injury  Description: INTERVENTIONS:  - Assess pt frequently for physical needs  - Identify cognitive and physical deficits and behaviors that affect risk of falls.  - [place] fall precautions as indicated by assessment.  - Educate pt/family on patient safety including physical limitations  - Instruct pt to call for assistance with activity based on assessment  - Modify environment to reduce risk of injury  - Provide assistive devices as appropriate  - Consider OT/PT consult to assist with strengthening/mobility  - Encourage toileting schedule  Outcome: Progressing     Problem: PAIN - ADULT  Goal: Verbalizes/displays adequate comfort level or patient's stated pain goal  Description: INTERVENTIONS:  - Encourage pt to monitor pain and request assistance  - Assess pain using appropriate pain scale  - Administer analgesics based on type and severity of pain and evaluate response  - Implement non-pharmacological measures as appropriate and evaluate response  - Consider cultural and social influences on pain and pain management  - Manage/alleviate  anxiety  - Utilize distraction and/or relaxation techniques  - Monitor for opioid side effects  - Notify MD/LIP if interventions unsuccessful or patient reports new pain  - Anticipate increased pain with activity and pre-medicate as appropriate  Outcome: Progressing     Problem: CARDIOVASCULAR - ADULT  Goal: Maintains optimal cardiac output and hemodynamic stability  Description: INTERVENTIONS:  - Monitor vital signs, rhythm, and trends  - Monitor for bleeding, hypotension and signs of decreased cardiac output  - Evaluate effectiveness of vasoactive medications to optimize hemodynamic stability  - Monitor arterial and/or venous puncture sites for bleeding and/or hematoma  - Assess quality of pulses, skin color and temperature  - Assess for signs of decreased coronary artery perfusion - ex. Angina  - Evaluate fluid balance, assess for edema, trend weights  Outcome: Progressing  Goal: Absence of cardiac arrhythmias or at baseline  Description: INTERVENTIONS:  - Continuous cardiac monitoring, monitor vital signs, obtain 12 lead EKG if indicated  - Evaluate effectiveness of antiarrhythmic and heart rate control medications as ordered  - Initiate emergency measures for life threatening arrhythmias  - Monitor electrolytes and administer replacement therapy as ordered  Outcome: Progressing

## 2024-07-26 NOTE — PROGRESS NOTES
CARDIODIAGNOSTIC PRELIMINARY REPORT:    DEREK protocol for 6:10 minutes, tolerated well    Second set of images pending

## 2024-07-26 NOTE — ED INITIAL ASSESSMENT (HPI)
Chest pain since 2330. Lest side, no radiation. Felt like it was indigestion but also had jaw pain

## 2024-07-26 NOTE — CM/SW NOTE
Pt discussed in rounds and chart was reviewed. No anticipated DC needs identified at this time. Will monitor for any changes in needs.      to remain available for support and/or discharge planning.    KATERIN Padilla  Discharge Planner  [phone]

## 2024-07-26 NOTE — PROGRESS NOTES
07/26/24 0320 07/26/24 0324 07/26/24 0327   Vital Signs   Temp 97.5 °F (36.4 °C)  --   --    Temp src Oral  --   --    Pulse 61 64 69   Heart Rate Source Monitor Monitor Monitor   Resp 20 22 22   Respiratory Quality Normal Normal Normal   /64 154/73 (!) 164/84   MAP (mmHg) 87 98 (!) 109   BP Location Right arm Right arm Right arm   BP Method Automatic Automatic Automatic   Patient Position Lying Sitting Standing

## 2024-07-26 NOTE — H&P
[facility] History and Physical      Chief Complaint   Patient presents with    Chest Pain Angina        PCP: Radha Restrepo MD      History of Present Illness: Patient is a 68 year old female with PMH sig for hypertension and DONNA presented with chest pain.  Symptoms began last night just before midnight.  She was doing random chores in her house when developed left-sided chest discomfort that radiated to her left jaw.  It lasted for 10 to 15 minutes.  Symptoms resolved spontaneously.  Denies any shortness of breath, sweats or chills or fevers.  In the ED she was slightly hypertensive.  Labs and troponin unremarkable.  Chest x-ray unremarkable.  Given her risk factors symptoms are concerning for unstable angina she was subsequently admitted with cardiology consultation for the a.m.    Past Medical History:    High blood pressure    Obstructive sleep apnea (adult) (pediatric)    AHI 45 SaO2 cintia  80 % CPAP 10 Premier    Sleep apnea      Past Surgical History:   Procedure Laterality Date    D & c      Other surgical history      cystoscopy        ALL:  No Known Allergies     No current outpatient medications on file.       Social History     Tobacco Use    Smoking status: Never    Smokeless tobacco: Never   Substance Use Topics    Alcohol use: No     Alcohol/week: 0.0 standard drinks of alcohol        Fam Hx  Family History   Problem Relation Age of Onset    Hypertension Father     Diabetes Maternal Grandmother     Cancer Maternal Grandfather     Breast Cancer Maternal Cousin Female 45        40's       Review of Systems  Comprehensive ROS reviewed and negative except for what is stated in HPI.      OBJECTIVE:  /73 (BP Location: Left arm)   Pulse 73   Temp 98.7 °F (37.1 °C) (Oral)   Resp 20   Ht 5' 2\" (1.575 m)   Wt 212 lb 1.3 oz (96.2 kg)   SpO2 98%   BMI 38.79 kg/m²   General:  Alert, no distress, appears stated age.    Head:  Normocephalic, without obvious abnormality, atraumatic.   Eyes:  Sclera  anicteric, No conjunctival pallor, EOMs intact.    Nose: Nares normal. Septum midline. Mucosa normal. No drainage.   Throat: Lips, mucosa, and tongue normal. Teeth and gums normal.   Neck: Supple, symmetrical, trachea midline, no cervical or supraclavicular lymph adenopathy, thyroid: no enlargment/tenderness/nodules appreciated   Lungs:   Clear to auscultation bilaterally. Normal effort   Chest wall:  No tenderness or deformity.   Heart:  Regular rate and rhythm, S1, S2 normal, no murmur, rub or gallop appreciated   Abdomen:   Soft, non-tender. Bowel sounds normal. No masses,  No organomegaly. Non distended   Extremities: Extremities normal, atraumatic, no cyanosis or edema.   Skin: Skin color, texture, turgor normal. No rashes or lesions.    Neurologic: Normal strength, no focal deficit appreciated     Data Review:    LABS:   Lab Results   Component Value Date    WBC 9.2 07/26/2024    HGB 13.0 07/26/2024    HCT 38.0 07/26/2024    .0 07/26/2024    CREATSERUM 0.95 07/26/2024    BUN 19 07/26/2024     07/26/2024    K 3.7 07/26/2024     07/26/2024    CO2 29.0 07/26/2024     07/26/2024    CA 8.9 07/26/2024    ALB 4.1 07/26/2024    ALKPHO 87 07/26/2024    BILT 0.3 07/26/2024    TP 7.2 07/26/2024    AST 18 07/26/2024    ALT 19 07/26/2024       CXR: All imaging personally reviewed.      Radiology: XR CHEST AP PORTABLE  (CPT=71045)    Result Date: 7/26/2024  PROCEDURE:  XR CHEST AP PORTABLE  (CPT=71045)  TECHNIQUE:  AP chest radiograph was obtained.  COMPARISON:  [facility] , XR, XR CHEST AP PORTABLE  (CPT=71045), 11/19/2021, 4:15 PM.  INDICATIONS:  chest pain  PATIENT STATED HISTORY: (As transcribed by Technologist)  Patient states left anterior chest pains radiating up left side of jaw xseveral hours.    FINDINGS:  Cardiomediastinal silhouette is within normal limits in size.  No focal consolidation, pleural effusion, or pneumothorax.            CONCLUSION:  No focal consolidation.   ED M.LEANDER. notified  of these findings with preliminary radiology report from [facility] services.   LOCATION:  [facility]      Dictated by (CST): Cherry Cervantes MD on 7/26/2024 at 7:55 AM     Finalized by (CST): Cherry Cervantes MD on 7/26/2024 at 7:55 AM          Assessment/Plan:     68 year old female with PMH sig for hypertension and DONNA presented with chest pain.     Chest pain, atypical   - ACS rule out  - risk factors; age, HTN, obesity  - asa, statin  - trend troponin  - echo, lexiscan stress  - cardiology following    Essential HTN  - lisinopril, thiazide    DONNA  - cpap qhs    FEN: regular diet, PT/OT  Proph: SCDs, lovenox  Code status: Full code    Outpatient records or previous hospital records reviewed.   [facility] hospitalist to continue to follow patient while in house    Dispo - if stress test negative can likely dc and f/u with PCP     India Reza MD  [facility]  Hospitalist  Message over [employer]  Pager: [phone]

## 2025-05-26 ENCOUNTER — HOSPITAL ENCOUNTER (EMERGENCY)
Facility: HOSPITAL | Age: 70
Discharge: HOME | End: 2025-05-26
Attending: EMERGENCY MEDICINE
Payer: MEDICARE

## 2025-05-26 ENCOUNTER — APPOINTMENT (OUTPATIENT)
Dept: GENERAL RADIOLOGY | Facility: HOSPITAL | Age: 70
End: 2025-05-26
Payer: MEDICARE

## 2025-05-26 VITALS
SYSTOLIC BLOOD PRESSURE: 139 MMHG | BODY MASS INDEX: 38.64 KG/M2 | HEART RATE: 68 BPM | DIASTOLIC BLOOD PRESSURE: 73 MMHG | WEIGHT: 210 LBS | HEIGHT: 62 IN | RESPIRATION RATE: 16 BRPM | TEMPERATURE: 97 F | OXYGEN SATURATION: 98 %

## 2025-05-26 DIAGNOSIS — J40 BRONCHITIS: Primary | ICD-10-CM

## 2025-05-26 LAB
ALBUMIN SERPL-MCNC: 4.2 G/DL (ref 3.2–4.8)
ALBUMIN/GLOB SERPL: 1.4 {RATIO} (ref 1–2)
ALP LIVER SERPL-CCNC: 123 U/L (ref 55–142)
ALT SERPL-CCNC: 29 U/L (ref 10–49)
ANION GAP SERPL CALC-SCNC: 9 MMOL/L (ref 0–18)
AST SERPL-CCNC: 22 U/L (ref ?–34)
BASOPHILS # BLD AUTO: 0.05 X10(3) UL (ref 0–0.2)
BASOPHILS NFR BLD AUTO: 0.6 %
BILIRUB SERPL-MCNC: 0.3 MG/DL (ref 0.2–1.1)
BUN BLD-MCNC: 13 MG/DL (ref 9–23)
CALCIUM BLD-MCNC: 9.1 MG/DL (ref 8.7–10.6)
CHLORIDE SERPL-SCNC: 106 MMOL/L (ref 98–112)
CO2 SERPL-SCNC: 29 MMOL/L (ref 21–32)
CREAT BLD-MCNC: 1.04 MG/DL (ref 0.55–1.02)
EGFRCR SERPLBLD CKD-EPI 2021: 57 ML/MIN/1.73M2 (ref 60–?)
EOSINOPHIL # BLD AUTO: 0.41 X10(3) UL (ref 0–0.7)
EOSINOPHIL NFR BLD AUTO: 4.9 %
ERYTHROCYTE [DISTWIDTH] IN BLOOD BY AUTOMATED COUNT: 14.4 %
FLUAV + FLUBV RNA SPEC NAA+PROBE: NEGATIVE
FLUAV + FLUBV RNA SPEC NAA+PROBE: NEGATIVE
GLOBULIN PLAS-MCNC: 3 G/DL (ref 2–3.5)
GLUCOSE BLD-MCNC: 96 MG/DL (ref 70–99)
HCT VFR BLD AUTO: 38 % (ref 35–48)
HGB BLD-MCNC: 12.9 G/DL (ref 12–16)
IMM GRANULOCYTES # BLD AUTO: 0.02 X10(3) UL (ref 0–1)
IMM GRANULOCYTES NFR BLD: 0.2 %
INR BLD: 0.98 (ref 0.8–1.2)
LYMPHOCYTES # BLD AUTO: 3.94 X10(3) UL (ref 1–4)
LYMPHOCYTES NFR BLD AUTO: 47.3 %
MCH RBC QN AUTO: 28.5 PG (ref 26–34)
MCHC RBC AUTO-ENTMCNC: 33.9 G/DL (ref 31–37)
MCV RBC AUTO: 83.9 FL (ref 80–100)
MONOCYTES # BLD AUTO: 0.75 X10(3) UL (ref 0.1–1)
MONOCYTES NFR BLD AUTO: 9 %
NEUTROPHILS # BLD AUTO: 3.16 X10 (3) UL (ref 1.5–7.7)
NEUTROPHILS # BLD AUTO: 3.16 X10(3) UL (ref 1.5–7.7)
NEUTROPHILS NFR BLD AUTO: 38 %
NT-PROBNP SERPL-MCNC: <35 PG/ML (ref ?–125)
OSMOLALITY SERPL CALC.SUM OF ELEC: 298 MOSM/KG (ref 275–295)
PLATELET # BLD AUTO: 270 10(3)UL (ref 150–450)
POTASSIUM SERPL-SCNC: 3.6 MMOL/L (ref 3.5–5.1)
PROT SERPL-MCNC: 7.2 G/DL (ref 5.7–8.2)
PROTHROMBIN TIME: 13.1 SECONDS (ref 11.6–14.8)
RBC # BLD AUTO: 4.53 X10(6)UL (ref 3.8–5.3)
RSV RNA SPEC NAA+PROBE: NEGATIVE
SARS-COV-2 RNA RESP QL NAA+PROBE: NOT DETECTED
SODIUM SERPL-SCNC: 144 MMOL/L (ref 136–145)
TROPONIN I SERPL HS-MCNC: <3 NG/L (ref ?–34)
WBC # BLD AUTO: 8.3 X10(3) UL (ref 4–11)

## 2025-05-26 PROCEDURE — 99285 EMERGENCY DEPT VISIT HI MDM: CPT

## 2025-05-26 PROCEDURE — 94640 AIRWAY INHALATION TREATMENT: CPT

## 2025-05-26 PROCEDURE — 0241U SARS-COV-2/FLU A AND B/RSV BY PCR (GENEXPERT): CPT | Performed by: EMERGENCY MEDICINE

## 2025-05-26 PROCEDURE — 96374 THER/PROPH/DIAG INJ IV PUSH: CPT

## 2025-05-26 PROCEDURE — 93005 ELECTROCARDIOGRAM TRACING: CPT

## 2025-05-26 PROCEDURE — 83880 ASSAY OF NATRIURETIC PEPTIDE: CPT | Performed by: EMERGENCY MEDICINE

## 2025-05-26 PROCEDURE — 93010 ELECTROCARDIOGRAM REPORT: CPT

## 2025-05-26 PROCEDURE — 99284 EMERGENCY DEPT VISIT MOD MDM: CPT

## 2025-05-26 PROCEDURE — 84484 ASSAY OF TROPONIN QUANT: CPT | Performed by: EMERGENCY MEDICINE

## 2025-05-26 PROCEDURE — 71045 X-RAY EXAM CHEST 1 VIEW: CPT | Performed by: EMERGENCY MEDICINE

## 2025-05-26 PROCEDURE — 85025 COMPLETE CBC W/AUTO DIFF WBC: CPT | Performed by: EMERGENCY MEDICINE

## 2025-05-26 PROCEDURE — 80053 COMPREHEN METABOLIC PANEL: CPT | Performed by: EMERGENCY MEDICINE

## 2025-05-26 PROCEDURE — 85610 PROTHROMBIN TIME: CPT | Performed by: EMERGENCY MEDICINE

## 2025-05-26 RX ORDER — PREDNISONE 20 MG/1
40 TABLET ORAL DAILY
Qty: 8 TABLET | Refills: 0 | Status: SHIPPED | OUTPATIENT
Start: 2025-05-27 | End: 2025-05-31

## 2025-05-26 RX ORDER — IPRATROPIUM BROMIDE AND ALBUTEROL SULFATE 2.5; .5 MG/3ML; MG/3ML
3 SOLUTION RESPIRATORY (INHALATION) ONCE
Status: COMPLETED | OUTPATIENT
Start: 2025-05-26 | End: 2025-05-26

## 2025-05-26 RX ORDER — AMLODIPINE BESYLATE 10 MG/1
10 TABLET ORAL DAILY
COMMUNITY
Start: 2025-02-17 | End: 2025-09-15

## 2025-05-26 RX ORDER — ALBUTEROL SULFATE 90 UG/1
INHALANT RESPIRATORY (INHALATION) EVERY 4 HOURS PRN
Qty: 1 EACH | Refills: 0 | Status: SHIPPED | OUTPATIENT
Start: 2025-05-26 | End: 2025-06-25

## 2025-05-26 RX ORDER — METHYLPREDNISOLONE SODIUM SUCCINATE 125 MG/2ML
125 INJECTION INTRAMUSCULAR; INTRAVENOUS ONCE
Status: COMPLETED | OUTPATIENT
Start: 2025-05-26 | End: 2025-05-26

## 2025-05-26 NOTE — ED INITIAL ASSESSMENT (HPI)
Pt states staring friday she started to not feel well, fatigued.  Pt states nasal drainage started yesterday and SOB with exertion. Pt states she has had a productive cough without fevers.  Pt also states she is wheezing.

## 2025-05-26 NOTE — ED PROVIDER NOTES
Patient Seen in: [facility] Emergency Department        History  Chief Complaint   Patient presents with    Difficulty Breathing    Wheezing    Cough/URI     Stated Complaint: cough, wheezing, sob    Subjective:   HPI            69-year-old female with history of sleep apnea, HTN presents to the ER with cough, wheezing, shortness of breath, dyspnea on exertion.  No prior episodes.  Patient reports chronic leg pain from her calcium channel blocker (amlodipine).  Denies any fevers, chills, nausea, vomiting, diarrhea.  Reports sore throat/hoarseness preceding her symptoms.      Objective:     Past Medical History:    High blood pressure    Obstructive sleep apnea (adult) (pediatric)    AHI 45 SaO2 cintia  80 % CPAP 10 Premier    Sleep apnea              Past Surgical History:   Procedure Laterality Date    D & c      Other surgical history      cystoscopy                Social History     Socioeconomic History    Marital status:    Tobacco Use    Smoking status: Never    Smokeless tobacco: Never   Vaping Use    Vaping status: Never Used   Substance and Sexual Activity    Alcohol use: No     Alcohol/week: 0.0 standard drinks of alcohol    Drug use: No     Social Drivers of Health     Food Insecurity: No Food Insecurity (7/26/2024)    Food Insecurity     Food Insecurity: Never true   Transportation Needs: No Transportation Needs (7/26/2024)    Transportation Needs     Lack of Transportation: No   Housing Stability: Low Risk  (7/26/2024)    Housing Stability     Housing Instability: No                                Physical Exam    ED Triage Vitals [05/26/25 1303]   /84   Pulse 79   Resp 18   Temp 97.1 °F (36.2 °C)   Temp src Temporal   SpO2 96 %   O2 Device None (Room air)       Current Vitals:   Vital Signs  BP: 139/73  Pulse: 68  Resp: 16  Temp: 97.1 °F (36.2 °C)  Temp src: Temporal  MAP (mmHg): 92    Oxygen Therapy  SpO2: 98 %  O2 Device: None (Room air)            Physical Exam  Vitals and nursing  note reviewed.   Constitutional:       General: She is not in acute distress.     Appearance: She is well-developed. She is not ill-appearing.   HENT:      Head: Normocephalic and atraumatic.   Eyes:      Extraocular Movements: Extraocular movements intact.      Pupils: Pupils are equal, round, and reactive to light.   Cardiovascular:      Rate and Rhythm: Normal rate and regular rhythm.      Pulses: Normal pulses.      Heart sounds: Normal heart sounds.   Pulmonary:      Effort: Pulmonary effort is normal. No tachypnea, accessory muscle usage or respiratory distress.      Breath sounds: Decreased breath sounds and wheezing present.   Abdominal:      General: Abdomen is flat. There is no distension.      Palpations: Abdomen is soft.      Tenderness: There is no abdominal tenderness.   Musculoskeletal:      Cervical back: Neck supple.      Right lower leg: Edema present.      Left lower leg: Edema present.   Skin:     General: Skin is dry.   Neurological:      Mental Status: She is alert and oriented to person, place, and time.   Psychiatric:         Mood and Affect: Mood normal.         Behavior: Behavior normal.                 ED Course  Labs Reviewed   COMP METABOLIC PANEL (14) - Abnormal; Notable for the following components:       Result Value    Creatinine 1.04 (*)     Calculated Osmolality 298 (*)     eGFR-Cr 57 (*)     All other components within normal limits   PRO BETA NATRIURETIC PEPTIDE - Normal   PROTHROMBIN TIME (PT) - Normal   TROPONIN I HIGH SENSITIVITY - Normal   SARS-COV-2/FLU A AND B/RSV BY PCR (GENEXPERT) - Normal    Narrative:     This test is intended for the qualitative detection and differentiation of SARS-CoV-2, influenza A, influenza B, and respiratory syncytial virus (RSV) viral RNA in nasopharyngeal or nares swabs from individuals suspected of respiratory viral infection consistent with COVID-19 by their healthcare provider. Signs and symptoms of respiratory viral infection due to  SARS-CoV-2, influenza, and RSV can be similar.    Test performed using the Xpert Xpress SARS-CoV-2/FLU/RSV (real time RT-PCR)  assay on the [employer] instrument, [employer], [place] [address].   This test is being used under the Food and Drug Administration's Emergency Use Authorization.    The authorized Fact Sheet for Healthcare Providers for this assay is available upon request from the laboratory.   CBC WITH DIFFERENTIAL WITH PLATELET   RAINBOW DRAW LAVENDER   RAINBOW DRAW LIGHT GREEN   RAINBOW DRAW BLUE       ED Course as of 05/26/25 1550  ------------------------------------------------------------  Time: 05/26 1433  Comment: I independently interpreted labs, unremarkable.  ------------------------------------------------------------  Time: 05/26 1434  Comment: Independent interpretation of the CHEST X-RAY shows the trachea is midline, normal cardiac size and contour. No pleural effusions.  No pneumothorax.  No infiltrates or pulmonary edema.    ------------------------------------------------------------  Time: 05/26 1434  Comment: EKG INTERPRETATION  Time: 1:44 PM  Normal sinus rhythm, ventricular rate is 73, LVH  Normal axis  Normal NV, QRS, and QTc intervals  Normal ST-T segments  EKG is unchanged compared to prior EKG completed on July 26, 2024.  I, the emergency physician, independently interpreted this EKG in the absence of a cardiologist.    ------------------------------------------------------------  Time: 05/26 1838  Comment: Patient ambulatory without difficulty or dyspnea.  Reassurance provided.  Advised of diagnosis of bronchitis and plan for prednisone and albuterol.     XR CHEST AP PORTABLE  (CPT=71045)  Result Date: 5/26/2025  CONCLUSION:  No acute pulmonary findings.   LOCATION:  [facility]      Dictated by (CST): Richard Mitchell MD on 5/26/2025 at 1:54 PM     Finalized by (CST): Richard Mitchell MD on 5/26/2025 at 1:54 PM                         MDM     Differential diagnosis includes COPD, CHF,  pleural effusion, pulmonary hypertension, asthma, anemia, anginal equivalent, bronchitis          Medical Decision Making  #Bronchitis  Acute sore throat, cough, wheezing, shortness of breath, clinically consistent with bronchitis  Chest x-ray is clear, no pulmonary edema, effusions or pneumonia  Symptoms improved with neb treatment in the ED, will send home with prednisone and albuterol  Return precautions given    Amount and/or Complexity of Data Reviewed  Labs: ordered. Decision-making details documented in ED Course.  Radiology: ordered and independent interpretation performed. Decision-making details documented in ED Course.  ECG/medicine tests: ordered and independent interpretation performed. Decision-making details documented in ED Course.    Risk  Prescription drug management.  Diagnosis or treatment significantly limited by social determinants of health.        Disposition and Plan     Clinical Impression:  1. Bronchitis         Disposition:  Discharge  5/26/2025  3:48 pm    Follow-up:  Radha Restrepo MD  [address] [address]  [phone]    Schedule an appointment as soon as possible for a visit in 3 day(s)  If symptoms worsen or do not improve.  Return to the ER if you require breathing treatments more frequently than every 4 hour          Medications Prescribed:  Current Discharge Medication List        START taking these medications    Details   albuterol 108 (90 Base) MCG/ACT Inhalation Aero Soln Inhale 2-4 puffs into the lungs every 4 (four) hours as needed for Wheezing.  Qty: 1 each, Refills: 0      predniSONE 20 MG Oral Tab Take 2 tablets (40 mg total) by mouth daily for 4 days.  Qty: 8 tablet, Refills: 0                   Supplementary Documentation:

## 2025-05-27 LAB
ATRIAL RATE: 73 BPM
P AXIS: 41 DEGREES
P-R INTERVAL: 156 MS
Q-T INTERVAL: 398 MS
QRS DURATION: 92 MS
QTC CALCULATION (BEZET): 438 MS
R AXIS: -3 DEGREES
T AXIS: 18 DEGREES
VENTRICULAR RATE: 73 BPM